# Patient Record
Sex: MALE | Race: WHITE | NOT HISPANIC OR LATINO | Employment: OTHER | URBAN - NONMETROPOLITAN AREA
[De-identification: names, ages, dates, MRNs, and addresses within clinical notes are randomized per-mention and may not be internally consistent; named-entity substitution may affect disease eponyms.]

---

## 2017-02-23 ENCOUNTER — OFFICE VISIT (OUTPATIENT)
Dept: CARDIOLOGY | Facility: CLINIC | Age: 77
End: 2017-02-23

## 2017-02-23 ENCOUNTER — TELEPHONE (OUTPATIENT)
Dept: CARDIOLOGY | Facility: CLINIC | Age: 77
End: 2017-02-23

## 2017-02-23 VITALS
WEIGHT: 167 LBS | SYSTOLIC BLOOD PRESSURE: 124 MMHG | HEART RATE: 76 BPM | BODY MASS INDEX: 22.62 KG/M2 | HEIGHT: 72 IN | DIASTOLIC BLOOD PRESSURE: 70 MMHG

## 2017-02-23 DIAGNOSIS — R53.83 OTHER FATIGUE: ICD-10-CM

## 2017-02-23 DIAGNOSIS — R06.02 SHORTNESS OF BREATH: ICD-10-CM

## 2017-02-23 DIAGNOSIS — E78.49 OTHER HYPERLIPIDEMIA: ICD-10-CM

## 2017-02-23 DIAGNOSIS — I25.10 CORONARY ARTERY DISEASE INVOLVING NATIVE CORONARY ARTERY OF NATIVE HEART WITHOUT ANGINA PECTORIS: Primary | ICD-10-CM

## 2017-02-23 DIAGNOSIS — I10 ESSENTIAL HYPERTENSION: ICD-10-CM

## 2017-02-23 PROCEDURE — 99214 OFFICE O/P EST MOD 30 MIN: CPT | Performed by: NURSE PRACTITIONER

## 2017-02-23 RX ORDER — CEPHRADINE 500 MG
1000 CAPSULE ORAL 2 TIMES DAILY
Qty: 180 EACH | Refills: 2 | Status: SHIPPED | OUTPATIENT
Start: 2017-02-23 | End: 2017-04-10 | Stop reason: SDUPTHER

## 2017-02-23 RX ORDER — RANOLAZINE 1000 MG/1
1000 TABLET, EXTENDED RELEASE ORAL 2 TIMES DAILY
Qty: 180 TABLET | Refills: 2 | Status: SHIPPED | OUTPATIENT
Start: 2017-02-23 | End: 2017-04-10 | Stop reason: SDUPTHER

## 2017-02-23 NOTE — TELEPHONE ENCOUNTER
Received call from Aretha patient's caregiver requesting an appt states patient is having increased shortness of air and anne, had appt. In January and had cancelled.  Patient is to come in this afternoon at 1:45 pm.

## 2017-02-23 NOTE — PROGRESS NOTES
Chief Complaint   Patient presents with   • Follow-up     Patient states still having alot of shortness of breath when he bends over and with any exertion. He states does not have much energy.   • Chest Pain     Has ache to left chest.   • Med Refill     Needs cardiac medication-90day.       Cardiac Complaints  Dyspnea, chest discomfort      Subjective   Zelalem Warren is a 76 y.o. male with a history of ischemic heart disease diagnosed in 2004 when he was noted to have ostial stenosis of the left circumflex. His last 2 stress test did show some changes, but he had been managed medically. At follow up in June, he had complained of more fatigue and tiredness, cardiac workup with stress and echo advised. Stress then showed small inferolateral ischemia and he continued to have problems so cath was advised. Cath then showed 65 to 70% stenosis of very small circumflex, medical management recommended.  He returns today for follow up reporting an increase in shortness of breath and fatigue since last visit here.  He states he can not quite do what he did several months ago, as the fatigue is very bothersome.  No recent labs are reported.  No refills needed.    Cardiac History  Past Surgical History   Procedure Laterality Date   • Cardiovascular stress test  11/10/2004     Stress- 2 min. 100% THR. Apical Ishchemia   • Echo - converted  11/29/2004     Echo- EF 55-60%. Apical WMA.   • Cath lab procedure  11/29/2004     Cath- 85% Ostial LCX   • Echo - converted  12/04/2009     Echo- EF 55-59%   • Cardiovascular stress test  12/04/2009     Stress- 2 min 36 sec. 91% THR. Anterior Ischemia. Norvasc 5mg 1 QD   • Cardiovascular stress test  10/10/2011     Stress- 3 min. 95% THR, BP- 178/80. AnteroSeptal Ischemia   • Echo - converted  10/10/2011     Echo-EF 65%. Septal WMA   • Echo - converted  08/02/2016     EF 65%   • Cardiovascular stress test  08/02/2016     L. Myoview- Small Anterolateral Ischemia   • Cardiac catheterization   09/22/2016     65-70% Small LCX       Current Outpatient Prescriptions   Medication Sig Dispense Refill   • aspirin 325 MG tablet Take 325 mg by mouth daily.     • Cholecalciferol (VITAMIN D3) 2000 UNITS capsule 2 capsules once daily     • esomeprazole (nexIUM) 40 MG capsule Take 40 mg by mouth Every Morning Before Breakfast.     • rosuvastatin (CRESTOR) 20 MG tablet Take 20 mg by mouth Daily.     • tamsulosin (FLOMAX) 0.4 MG capsule 24 hr capsule Take 1 capsule by mouth Every Night.     • L-Arginine 1000 MG tablet Take 1,000 mg by mouth 2 (Two) Times a Day. 180 each 2   • ranolazine (RANEXA) 1000 MG 12 hr tablet Take 1 tablet by mouth 2 (Two) Times a Day. 180 tablet 2     No current facility-administered medications for this visit.        Percocet [oxycodone-acetaminophen]    Past Medical History   Diagnosis Date   • History of kidney stones    • History of palpitations    • History of prostate disorder      laser surgery   • Hypercholesteremia    • Hypertension    • IHD (ischemic heart disease)    • PUD (peptic ulcer disease)        Social History     Social History   • Marital status:      Spouse name: N/A   • Number of children: N/A   • Years of education: N/A     Occupational History   • Not on file.     Social History Main Topics   • Smoking status: Former Smoker     Quit date: 1980   • Smokeless tobacco: Never Used   • Alcohol use No   • Drug use: No   • Sexual activity: Not on file     Other Topics Concern   • Not on file     Social History Narrative       Family History   Problem Relation Age of Onset   • Diabetes Mother    • Heart disease Mother    • Hyperlipidemia Mother    • Hypertension Mother    • Hypertension Father    • Hyperlipidemia Father    • Heart disease Father    • Diabetes Other    • Hyperlipidemia Other    • Hypertension Daughter        Review of Systems   Constitutional: Negative.    HENT: Negative.    Respiratory: Negative.    Cardiovascular: Negative.    Gastrointestinal:  "Negative.    Endocrine: Negative.    Musculoskeletal: Negative.    Skin: Negative.    Neurological: Negative.    Psychiatric/Behavioral: Negative.        DiabetesNo  Thyroidnormal    Objective     Visit Vitals   • /70 (BP Location: Right arm)   • Pulse 76   • Ht 71.5\" (181.6 cm)   • Wt 167 lb (75.8 kg)   • BMI 22.97 kg/m2       Physical Exam   Constitutional: He is oriented to person, place, and time. He appears well-developed and well-nourished.   HENT:   Head: Normocephalic and atraumatic.   Eyes: EOM are normal. Pupils are equal, round, and reactive to light.   Neck: Normal range of motion.   Cardiovascular: Normal rate and regular rhythm.    Murmur heard.  Pulmonary/Chest: Effort normal and breath sounds normal.   Abdominal: Soft.   Musculoskeletal: Normal range of motion.   Neurological: He is alert and oriented to person, place, and time.   Skin: Skin is warm and dry.   Psychiatric: He has a normal mood and affect.       Procedures    Assessment/Plan     HR and BP are stable today.  We will increase his ranexa to 1000mg BID and add l.arginine 1000mg BID to daily regimen to aid in chest discomfort.  Since patient reports more shortness of breath and fatigue with exertion, echo will be advised to look for any decline in LV function or pericardial effusion.  More recommendations to follow.  Lab order will be given with more recommendations to follow.  If echo is okay as well as labs, patient encouraged to discuss PFTs with you and further lung workup.  6 month follow up advised or sooner if needed.      Problems Addressed this Visit        Cardiovascular and Mediastinum    Hyperlipemia    Relevant Orders    CBC & Differential    Comprehensive Metabolic Panel    Lipid Panel    TSH    CAD (coronary artery disease) - Primary    Relevant Medications    ranolazine (RANEXA) 1000 MG 12 hr tablet    Other Relevant Orders    CBC & Differential    Comprehensive Metabolic Panel    Lipid Panel    TSH      Other Visit " Diagnoses     Essential hypertension        Shortness of breath        Relevant Orders    Adult Transthoracic Echo Complete    CBC & Differential    Comprehensive Metabolic Panel    Lipid Panel    D-dimer, Quantitative    Other fatigue        Relevant Orders    Adult Transthoracic Echo Complete    CBC & Differential    Comprehensive Metabolic Panel    Lipid Panel    TSH    Testosterone    Vitamin B12 & Folate    D-dimer, Quantitative                  Electronically signed by COOKIE Chapin February 24, 2017 10:14 AM

## 2017-03-01 ENCOUNTER — OUTSIDE FACILITY SERVICE (OUTPATIENT)
Dept: CARDIOLOGY | Facility: CLINIC | Age: 77
End: 2017-03-01

## 2017-03-01 ENCOUNTER — HOSPITAL ENCOUNTER (OUTPATIENT)
Dept: CARDIOLOGY | Facility: HOSPITAL | Age: 77
Discharge: HOME | End: 2017-03-01
Admitting: NURSE PRACTITIONER

## 2017-03-01 DIAGNOSIS — R53.83 OTHER FATIGUE: ICD-10-CM

## 2017-03-01 DIAGNOSIS — R06.02 SHORTNESS OF BREATH: ICD-10-CM

## 2017-03-01 PROCEDURE — 93306 TTE W/DOPPLER COMPLETE: CPT

## 2017-03-01 PROCEDURE — 93306 TTE W/DOPPLER COMPLETE: CPT | Performed by: INTERNAL MEDICINE

## 2017-04-10 ENCOUNTER — TELEPHONE (OUTPATIENT)
Dept: CARDIOLOGY | Facility: CLINIC | Age: 77
End: 2017-04-10

## 2017-04-10 RX ORDER — ROSUVASTATIN CALCIUM 20 MG/1
20 TABLET, COATED ORAL DAILY
Qty: 90 TABLET | Refills: 0 | Status: SHIPPED | OUTPATIENT
Start: 2017-04-10 | End: 2017-07-10 | Stop reason: SDUPTHER

## 2017-04-10 RX ORDER — RANOLAZINE 1000 MG/1
1000 TABLET, EXTENDED RELEASE ORAL 2 TIMES DAILY
Qty: 180 TABLET | Refills: 1 | Status: SHIPPED | OUTPATIENT
Start: 2017-04-10 | End: 2017-08-28 | Stop reason: SDUPTHER

## 2017-04-10 RX ORDER — CEPHRADINE 500 MG
1000 CAPSULE ORAL 2 TIMES DAILY
Qty: 180 EACH | Refills: 1 | Status: SHIPPED | OUTPATIENT
Start: 2017-04-10 | End: 2017-08-28 | Stop reason: SDUPTHER

## 2017-07-12 RX ORDER — ROSUVASTATIN CALCIUM 20 MG/1
TABLET, COATED ORAL
Qty: 90 TABLET | Refills: 0 | Status: SHIPPED | OUTPATIENT
Start: 2017-07-12 | End: 2017-08-28 | Stop reason: SDUPTHER

## 2017-08-28 ENCOUNTER — OFFICE VISIT (OUTPATIENT)
Dept: CARDIOLOGY | Facility: CLINIC | Age: 77
End: 2017-08-28

## 2017-08-28 VITALS
SYSTOLIC BLOOD PRESSURE: 130 MMHG | HEIGHT: 72 IN | HEART RATE: 72 BPM | WEIGHT: 168 LBS | BODY MASS INDEX: 22.75 KG/M2 | DIASTOLIC BLOOD PRESSURE: 70 MMHG

## 2017-08-28 DIAGNOSIS — I25.9 IHD (ISCHEMIC HEART DISEASE): Primary | ICD-10-CM

## 2017-08-28 DIAGNOSIS — R06.02 SHORTNESS OF BREATH: ICD-10-CM

## 2017-08-28 DIAGNOSIS — I25.10 CORONARY ARTERY DISEASE INVOLVING NATIVE CORONARY ARTERY OF NATIVE HEART WITHOUT ANGINA PECTORIS: ICD-10-CM

## 2017-08-28 DIAGNOSIS — E78.00 PURE HYPERCHOLESTEROLEMIA: ICD-10-CM

## 2017-08-28 PROCEDURE — 99213 OFFICE O/P EST LOW 20 MIN: CPT | Performed by: NURSE PRACTITIONER

## 2017-08-28 RX ORDER — CEPHRADINE 500 MG
1000 CAPSULE ORAL 2 TIMES DAILY
Qty: 180 EACH | Refills: 1 | Status: SHIPPED | OUTPATIENT
Start: 2017-08-28 | End: 2019-11-13 | Stop reason: SDUPTHER

## 2017-08-28 RX ORDER — ROSUVASTATIN CALCIUM 20 MG/1
20 TABLET, COATED ORAL NIGHTLY
Qty: 90 TABLET | Refills: 2 | Status: SHIPPED | OUTPATIENT
Start: 2017-08-28 | End: 2018-03-05 | Stop reason: SDUPTHER

## 2017-08-28 RX ORDER — RANOLAZINE 1000 MG/1
1000 TABLET, EXTENDED RELEASE ORAL 2 TIMES DAILY
Qty: 180 TABLET | Refills: 2 | Status: SHIPPED | OUTPATIENT
Start: 2017-08-28 | End: 2018-03-05 | Stop reason: SDUPTHER

## 2017-08-28 NOTE — PROGRESS NOTES
"Chief Complaint   Patient presents with   • Follow-up     For cardiac management.   • Chest Pain     He states has had some pain in left chest, he feels could be related to gas.    • Shortness of Breath     He reports follows with Dr Guzman, he is to have CT scan on lungs 9/11/17, wife states \"to check to see if 2 spots have enlarged\".    • Med Refill     Needs refills on cardiac medication-90 day.       Subjective       Zelalem Warren is a 77 y.o. male with a history of ischemic heart disease diagnosed in 2004 when he was noted to have ostial stenosis of the left circumflex. His last 2 stress test did show some changes, but he had been managed medically. At follow up in June 2016, he had complained of more fatigue and tiredness. Repeat stress showed small inferolateral ischemia and he continued to have problems. Cath was done and showed 65 to 70% stenosis of very small circumflex, medical management recommended. At his last office visit the dose of Ranexa was increased and L.arginine added. Due to shortness of breath an echocardiogram was repeated. LV function was normal, no effusion noted and RVSP unremarkable.   Today he comes to the office for a follow up appointment and denies cardiac symptoms. Sometimes he feels indigestion if \"eat wrong things\" and cola will ease the discomfort. He is maintaining his normal activities including yard work and states he has not had symptoms except shortness of breath which is not new symptom.     HPI         Cardiac History:    Past Surgical History:   Procedure Laterality Date   • CARDIAC CATHETERIZATION  09/22/2016    65-70% Small LCX   • CARDIOVASCULAR STRESS TEST  11/10/2004    Stress- 2 min. 100% THR. Apical Ishchemia   • CARDIOVASCULAR STRESS TEST  12/04/2009    Stress- 2 min 36 sec. 91% THR. Anterior Ischemia. Norvasc 5mg 1 QD   • CARDIOVASCULAR STRESS TEST  10/10/2011    Stress- 3 min. 95% THR, BP- 178/80. AnteroSeptal Ischemia   • CARDIOVASCULAR STRESS TEST  " 08/02/2016    L. Myoview- Small Anterolateral Ischemia   • CATH LAB PROCEDURE  11/29/2004    Cath- 85% Ostial LCX   • ECHO - CONVERTED  11/29/2004    Echo- EF 55-60%. Apical WMA.   • ECHO - CONVERTED  12/04/2009    Echo- EF 55-59%   • ECHO - CONVERTED  10/10/2011    Echo-EF 65%. Septal WMA   • ECHO - CONVERTED  08/02/2016    EF 65%   • ECHO - CONVERTED  03/01/2017    EF > 65%, mild MR, RVSP 27 mmHg       Current Outpatient Prescriptions   Medication Sig Dispense Refill   • aspirin 325 MG tablet Take 325 mg by mouth daily.     • Cholecalciferol (VITAMIN D3) 2000 UNITS capsule 2 capsules once daily     • esomeprazole (nexIUM) 40 MG capsule Take 40 mg by mouth Every Morning Before Breakfast.     • L-Arginine 1000 MG tablet Take 1,000 mg by mouth 2 (Two) Times a Day. 180 each 1   • ranolazine (RANEXA) 1000 MG 12 hr tablet Take 1 tablet by mouth 2 (Two) Times a Day. 180 tablet 2   • rosuvastatin (CRESTOR) 20 MG tablet Take 1 tablet by mouth Every Night. 90 tablet 2   • tamsulosin (FLOMAX) 0.4 MG capsule 24 hr capsule Take 1 capsule by mouth Every Night.     • Umeclidinium-Vilanterol (ANORO ELLIPTA) 62.5-25 MCG/INH aerosol powder  Inhale Daily.       No current facility-administered medications for this visit.        Percocet [oxycodone-acetaminophen]    Past Medical History:   Diagnosis Date   • History of kidney stones    • History of palpitations    • History of prostate disorder     laser surgery   • Hypercholesteremia    • Hypertension    • IHD (ischemic heart disease)    • PUD (peptic ulcer disease)        Social History     Social History   • Marital status:      Spouse name: N/A   • Number of children: N/A   • Years of education: N/A     Occupational History   • Not on file.     Social History Main Topics   • Smoking status: Former Smoker     Quit date: 1980   • Smokeless tobacco: Never Used   • Alcohol use No   • Drug use: No   • Sexual activity: Not on file     Other Topics Concern   • Not on file  "    Social History Narrative       Family History   Problem Relation Age of Onset   • Diabetes Mother    • Heart disease Mother    • Hyperlipidemia Mother    • Hypertension Mother    • Hypertension Father    • Hyperlipidemia Father    • Heart disease Father    • Diabetes Other    • Hyperlipidemia Other    • Hypertension Daughter        Review of Systems   Constitution: Negative for decreased appetite and malaise/fatigue.   HENT: Negative for congestion and sore throat.    Eyes: Negative for blurred vision.   Cardiovascular: Negative for chest pain, dyspnea on exertion, leg swelling, near-syncope and palpitations.   Respiratory: Positive for shortness of breath. Negative for sleep disturbances due to breathing.    Endocrine: Negative for cold intolerance and heat intolerance.   Hematologic/Lymphatic: Negative for adenopathy. Does not bruise/bleed easily.   Skin: Positive for skin cancer (follows [facility] dermatologist). Negative for itching and nail changes.   Musculoskeletal: Positive for arthritis. Negative for myalgias.   Gastrointestinal: Positive for heartburn. Negative for abdominal pain, dysphagia, melena and nausea.   Genitourinary: Negative for dysuria and frequency.   Neurological: Negative for dizziness, light-headedness, seizures and vertigo.   Psychiatric/Behavioral: Negative for altered mental status. The patient does not have insomnia.    Allergic/Immunologic: Negative for hives.        Diabetes- No  Thyroid-normal    Objective     /70 (BP Location: Left arm)  Pulse 72  Ht 71.5\" (181.6 cm)  Wt 168 lb (76.2 kg)  BMI 23.1 kg/m2    Physical Exam   Constitutional: He is oriented to person, place, and time. He appears well-nourished.   HENT:   Head: Normocephalic.   Eyes: Conjunctivae are normal. Pupils are equal, round, and reactive to light.   Neck: Normal range of motion. No JVD present. Carotid bruit is not present. No thyromegaly present.   Cardiovascular: Normal rate, regular rhythm, S1 normal " "and normal pulses.    No murmur heard.  Slightly loud S2   Pulmonary/Chest: Effort normal and breath sounds normal. He has no wheezes. He has no rales.   Abdominal: Soft. Bowel sounds are normal. He exhibits no distension. There is no tenderness.   Musculoskeletal: Normal range of motion. He exhibits no edema.   Neurological: He is alert and oriented to person, place, and time.   Skin: Skin is warm and dry. No rash noted. No pallor.   Psychiatric: He has a normal mood and affect. His behavior is normal.    Procedures        Assessment/Plan      Zelalem was seen today for follow-up, chest pain, shortness of breath and med refill.    Diagnoses and all orders for this visit:    IHD (ischemic heart disease)    Coronary artery disease involving native coronary artery of native heart without angina pectoris    Pure hypercholesterolemia    Shortness of breath    Other orders  -     L-Arginine 1000 MG tablet; Take 1,000 mg by mouth 2 (Two) Times a Day.  -     ranolazine (RANEXA) 1000 MG 12 hr tablet; Take 1 tablet by mouth 2 (Two) Times a Day.  -     rosuvastatin (CRESTOR) 20 MG tablet; Take 1 tablet by mouth Every Night.        The report of his most recent echocardiogram was reviewed. He is following with Dr. Kilpatrick for management of pulmonary issues. Shortnes of breath has improved with current inhaler. Next month he will have CT scan of lung for evaluation of \"spots on lung\". He reports improvement of chest discomfort since increasing Ranexa and adding L-arginine. His vital signs and BMI are normal. We will continue same cardiac medication at this time. I encouraged him on maintaining activity, avoid extreme heat/cold. He will follow with you for management of labs.            Electronically signed by COOKIE Martinez,  August 30, 2017 11:11 AM  "

## 2018-03-05 ENCOUNTER — OFFICE VISIT (OUTPATIENT)
Dept: CARDIOLOGY | Facility: CLINIC | Age: 78
End: 2018-03-05

## 2018-03-05 VITALS
WEIGHT: 169 LBS | HEIGHT: 71 IN | HEART RATE: 80 BPM | DIASTOLIC BLOOD PRESSURE: 68 MMHG | SYSTOLIC BLOOD PRESSURE: 110 MMHG | BODY MASS INDEX: 23.66 KG/M2

## 2018-03-05 DIAGNOSIS — I25.9 IHD (ISCHEMIC HEART DISEASE): Primary | ICD-10-CM

## 2018-03-05 DIAGNOSIS — E78.2 MIXED HYPERLIPIDEMIA: ICD-10-CM

## 2018-03-05 DIAGNOSIS — I25.10 CORONARY ARTERY DISEASE INVOLVING NATIVE CORONARY ARTERY OF NATIVE HEART WITHOUT ANGINA PECTORIS: ICD-10-CM

## 2018-03-05 PROCEDURE — 99213 OFFICE O/P EST LOW 20 MIN: CPT | Performed by: NURSE PRACTITIONER

## 2018-03-05 RX ORDER — RANOLAZINE 1000 MG/1
1000 TABLET, EXTENDED RELEASE ORAL EVERY 12 HOURS SCHEDULED
Qty: 180 TABLET | Refills: 3 | Status: SHIPPED | OUTPATIENT
Start: 2018-03-05 | End: 2018-09-10 | Stop reason: SDUPTHER

## 2018-03-05 RX ORDER — ROSUVASTATIN CALCIUM 20 MG/1
20 TABLET, COATED ORAL NIGHTLY
Qty: 90 TABLET | Refills: 3 | Status: SHIPPED | OUTPATIENT
Start: 2018-03-05 | End: 2018-09-10 | Stop reason: SDUPTHER

## 2018-03-05 NOTE — PROGRESS NOTES
Chief Complaint   Patient presents with   • Follow-up     For cardiac management. Last labs in chart.   • Dizziness     He states when he bends over and stands up, he feels related to lungs.   • Shortness of Breath     He states about the same, follows with Dr Guzman.   • Med Refill     Needs refills on cardiac medication-90 day.       Subjective       Zelalem Warren is a 77 y.o. male with a history of ischemic heart disease diagnosed in 2004 when he was noted to have ostial stenosis of the left circumflex. His last 2 stress test did show some changes, but he had been managed medically. At follow up in June 2016, he had complained of more fatigue and tiredness. Repeat stress showed small inferolateral ischemia and he continued to have problems. Cath was done and showed 65 to 70% stenosis of very small circumflex, medical management recommended.  The dose of Ranexa was increased and L.arginine added.  Due to shortness of breath an echocardiogram was repeated.  LV function was normal, no effusion noted and RVSP unremarkable.   He came today for his regular follow up visit.  He is feeling well other than shortness of breath which is mild and unchanged.  He does follow with Dr. Guzman.  He denies chest pain, weakness, or fatigue.  He remains active with his activities.  Labs have been managed with primary care.  Most recent lipids in April 2017 showed cholesterol 173, tri 98, HDL 49, and .  Refills requested on cardiac meds.      HPI         Cardiac History:    Past Surgical History:   Procedure Laterality Date   • CARDIAC CATHETERIZATION  09/22/2016    65-70% Small LCX   • CARDIOVASCULAR STRESS TEST  11/10/2004    Stress- 2 min. 100% THR. Apical Ishchemia   • CARDIOVASCULAR STRESS TEST  12/04/2009    Stress- 2 min 36 sec. 91% THR. Anterior Ischemia. Norvasc 5mg 1 QD   • CARDIOVASCULAR STRESS TEST  10/10/2011    Stress- 3 min. 95% THR, BP- 178/80. AnteroSeptal Ischemia   • CARDIOVASCULAR STRESS TEST  08/02/2016     L. Myoview- Small Anterolateral Ischemia   • CATH LAB PROCEDURE  11/29/2004    Cath- 85% Ostial LCX   • ECHO - CONVERTED  11/29/2004    Echo- EF 55-60%. Apical WMA.   • ECHO - CONVERTED  12/04/2009    Echo- EF 55-59%   • ECHO - CONVERTED  10/10/2011    Echo-EF 65%. Septal WMA   • ECHO - CONVERTED  08/02/2016    EF 65%   • ECHO - CONVERTED  03/01/2017    EF > 65%, mild MR, RVSP 27 mmHg       Current Outpatient Prescriptions   Medication Sig Dispense Refill   • aspirin 325 MG tablet Take 325 mg by mouth daily.     • Cholecalciferol (VITAMIN D3) 2000 UNITS capsule 2 capsules once daily     • esomeprazole (nexIUM) 40 MG capsule Take 40 mg by mouth Every Morning Before Breakfast.     • L-Arginine 1000 MG tablet Take 1,000 mg by mouth 2 (Two) Times a Day. 180 each 1   • ranolazine (RANEXA) 1000 MG 12 hr tablet Take 1 tablet by mouth Every 12 (Twelve) Hours. 180 tablet 3   • rosuvastatin (CRESTOR) 20 MG tablet Take 1 tablet by mouth Every Night. 90 tablet 3   • tamsulosin (FLOMAX) 0.4 MG capsule 24 hr capsule Take 1 capsule by mouth Every Night.     • Umeclidinium-Vilanterol (ANORO ELLIPTA) 62.5-25 MCG/INH aerosol powder  Inhale Daily.       No current facility-administered medications for this visit.        Percocet [oxycodone-acetaminophen]    Past Medical History:   Diagnosis Date   • History of kidney stones    • History of palpitations    • History of prostate disorder     laser surgery   • Hypercholesteremia    • Hypertension    • IHD (ischemic heart disease)    • PUD (peptic ulcer disease)        Social History     Social History   • Marital status:      Spouse name: N/A   • Number of children: N/A   • Years of education: N/A     Occupational History   • Not on file.     Social History Main Topics   • Smoking status: Former Smoker     Quit date: 1980   • Smokeless tobacco: Never Used   • Alcohol use No   • Drug use: No   • Sexual activity: Not on file     Other Topics Concern   • Not on file     Social  "History Narrative       Family History   Problem Relation Age of Onset   • Diabetes Mother    • Heart disease Mother    • Hyperlipidemia Mother    • Hypertension Mother    • Hypertension Father    • Hyperlipidemia Father    • Heart disease Father    • Diabetes Other    • Hyperlipidemia Other    • Hypertension Daughter        Review of Systems   Constitution: Negative for decreased appetite, weakness, malaise/fatigue, weight gain and weight loss.   HENT: Negative.    Eyes: Negative.    Cardiovascular: Positive for dyspnea on exertion. Negative for chest pain, leg swelling, orthopnea, palpitations and syncope.   Respiratory: Positive for shortness of breath. Negative for cough and wheezing.    Endocrine: Negative.    Hematologic/Lymphatic: Negative.    Skin: Negative.    Musculoskeletal: Negative for joint pain and myalgias.   Gastrointestinal: Negative for abdominal pain, heartburn and melena.   Genitourinary: Negative for dysuria and hematuria.   Neurological: Negative for dizziness.   Psychiatric/Behavioral: Negative for altered mental status and depression.   Allergic/Immunologic: Negative.         Diabetes- No  Thyroid-normal    Objective     /68 (BP Location: Right arm)  Pulse 80  Ht 181.6 cm (71.5\")  Wt 76.7 kg (169 lb)  BMI 23.24 kg/m2    Physical Exam   Constitutional: He is oriented to person, place, and time. He appears well-developed and well-nourished.   HENT:   Head: Normocephalic.   Eyes: Pupils are equal, round, and reactive to light.   Neck: Normal range of motion.   Cardiovascular: Normal rate, regular rhythm and intact distal pulses.    Murmur heard.  Pulmonary/Chest: Effort normal and breath sounds normal.   Abdominal: Soft. Bowel sounds are normal. He exhibits no distension.   Musculoskeletal: Normal range of motion. He exhibits no edema.   Neurological: He is alert and oriented to person, place, and time.   Skin: Skin is warm and dry.   Psychiatric: He has a normal mood and affect. "   Nursing note and vitals reviewed.     Procedures          Assessment/Plan    Heart rate and blood pressure stable.  Most recent cardiac work up reviewed with him which showed normal LV function and mild MR.  We will continue medical management.  Refills sent for Crestor and Ranexa.  Continue L-arginine.  He remains on 325 mg aspirin without excessive bruising or bleeding.  Will continue the same for now.  He was encouraged to remain active.  Maintain adequate hydration.  Follow heart healthy diet low in saturated fat and cholesterol.  Body mass index is 23.24 kg/(m^2).  This is normal.  He appears to be stable.  He is asymptomatic, so no further work up ordered today.  We will see him back in six months or sooner if needed.          Zelalem was seen today for follow-up, dizziness, shortness of breath and med refill.    Diagnoses and all orders for this visit:    IHD (ischemic heart disease)    Coronary artery disease involving native coronary artery of native heart without angina pectoris    Mixed hyperlipidemia    Other orders  -     rosuvastatin (CRESTOR) 20 MG tablet; Take 1 tablet by mouth Every Night.  -     ranolazine (RANEXA) 1000 MG 12 hr tablet; Take 1 tablet by mouth Every 12 (Twelve) Hours.                  Electronically signed by COOKIE Wooten,  March 5, 2018 5:28 PM

## 2018-09-10 ENCOUNTER — OFFICE VISIT (OUTPATIENT)
Dept: CARDIOLOGY | Facility: CLINIC | Age: 78
End: 2018-09-10

## 2018-09-10 VITALS
HEART RATE: 76 BPM | WEIGHT: 169 LBS | HEIGHT: 71 IN | DIASTOLIC BLOOD PRESSURE: 80 MMHG | BODY MASS INDEX: 23.66 KG/M2 | SYSTOLIC BLOOD PRESSURE: 130 MMHG

## 2018-09-10 DIAGNOSIS — E78.2 MIXED HYPERLIPIDEMIA: ICD-10-CM

## 2018-09-10 DIAGNOSIS — R06.02 SHORTNESS OF BREATH: ICD-10-CM

## 2018-09-10 DIAGNOSIS — I20.9 ANGINAL SYNDROME (HCC): ICD-10-CM

## 2018-09-10 DIAGNOSIS — I25.10 CORONARY ARTERY DISEASE INVOLVING NATIVE CORONARY ARTERY OF NATIVE HEART WITHOUT ANGINA PECTORIS: Primary | ICD-10-CM

## 2018-09-10 PROCEDURE — 99213 OFFICE O/P EST LOW 20 MIN: CPT | Performed by: NURSE PRACTITIONER

## 2018-09-10 RX ORDER — ROSUVASTATIN CALCIUM 20 MG/1
20 TABLET, COATED ORAL NIGHTLY
Qty: 90 TABLET | Refills: 3 | Status: SHIPPED | OUTPATIENT
Start: 2018-09-10 | End: 2019-03-12 | Stop reason: SDUPTHER

## 2018-09-10 RX ORDER — RANOLAZINE 1000 MG/1
1000 TABLET, EXTENDED RELEASE ORAL EVERY 12 HOURS SCHEDULED
Qty: 180 TABLET | Refills: 3 | Status: SHIPPED | OUTPATIENT
Start: 2018-09-10 | End: 2019-03-12 | Stop reason: SDUPTHER

## 2018-09-10 NOTE — PROGRESS NOTES
Chief Complaint   Patient presents with   • Follow-up     For cardiac management. Patient is on aspirin. Reports that he has had some shortness of breath. Last lab work was done a couple of months ago per PCP, not in chart.    • Med Refill     Needs refills on cardiac medications. 90 day supplys to [facility] in [place].        Cardiac Complaints  dyspnea      Subjective   Zelalem KATHERINE Warren is a 78 y.o. male with ischemic heart disease and hyperlipidemia diagnosed in 2004 when he was noted to have ostial stenosis of the left circumflex. His last 2 stress test did show some changes, but he had been managed medically. At follow up in June 2016, he had complained of more fatigue and tiredness. Repeat stress showed small inferolateral ischemia and he continued to have problems. Cath was done and showed 65 to 70% stenosis of very small circumflex, medical management recommended.  The dose of Ranexa was increased and L.arginine added.  Due to shortness of breath an echocardiogram was repeated.  LV function was normal, no effusion noted,and RVSP unremarkable. He does continue to have shortness of breath but attributes to his underlying lung issues for which he follows with Dr. Guzman and uses anoro inhaler.Labs are done with PCP most recent done with PCP a few months ago, no recent copy is available for review.  Refills of crestor and ranexa requested for 90 day supply.        Cardiac History  Past Surgical History:   Procedure Laterality Date   • CARDIAC CATHETERIZATION  09/22/2016    65-70% Small LCX   • CARDIOVASCULAR STRESS TEST  11/10/2004    Stress- 2 min. 100% THR. Apical Ishchemia   • CARDIOVASCULAR STRESS TEST  12/04/2009    Stress- 2 min 36 sec. 91% THR. Anterior Ischemia. Norvasc 5mg 1 QD   • CARDIOVASCULAR STRESS TEST  10/10/2011    Stress- 3 min. 95% THR, BP- 178/80. AnteroSeptal Ischemia   • CARDIOVASCULAR STRESS TEST  08/02/2016    L. Myoview- Small Anterolateral Ischemia   • CATH LAB PROCEDURE   11/29/2004    Cath- 85% Ostial LCX   • ECHO - CONVERTED  11/29/2004    Echo- EF 55-60%. Apical WMA.   • ECHO - CONVERTED  12/04/2009    Echo- EF 55-59%   • ECHO - CONVERTED  10/10/2011    Echo-EF 65%. Septal WMA   • ECHO - CONVERTED  08/02/2016    EF 65%   • ECHO - CONVERTED  03/01/2017    EF > 65%, mild MR, RVSP 27 mmHg       Current Outpatient Prescriptions   Medication Sig Dispense Refill   • aspirin 325 MG tablet Take 325 mg by mouth daily.     • Cholecalciferol (VITAMIN D3) 2000 UNITS capsule 2 capsules once daily     • esomeprazole (nexIUM) 40 MG capsule Take 40 mg by mouth Every Morning Before Breakfast.     • L-Arginine 1000 MG tablet Take 1,000 mg by mouth 2 (Two) Times a Day. 180 each 1   • ranolazine (RANEXA) 1000 MG 12 hr tablet Take 1 tablet by mouth Every 12 (Twelve) Hours. 180 tablet 3   • rosuvastatin (CRESTOR) 20 MG tablet Take 1 tablet by mouth Every Night. 90 tablet 3   • tamsulosin (FLOMAX) 0.4 MG capsule 24 hr capsule Take 1 capsule by mouth Every Night.     • Umeclidinium-Vilanterol (ANORO ELLIPTA) 62.5-25 MCG/INH aerosol powder  Inhale Daily.       No current facility-administered medications for this visit.        Percocet [oxycodone-acetaminophen]    Past Medical History:   Diagnosis Date   • History of kidney stones    • History of palpitations    • History of prostate disorder     laser surgery   • Hypercholesteremia    • Hypertension    • IHD (ischemic heart disease)    • PUD (peptic ulcer disease)        Social History     Social History   • Marital status:      Spouse name: N/A   • Number of children: N/A   • Years of education: N/A     Occupational History   • Not on file.     Social History Main Topics   • Smoking status: Former Smoker     Quit date: 1980   • Smokeless tobacco: Never Used   • Alcohol use No   • Drug use: No   • Sexual activity: Not on file     Other Topics Concern   • Not on file     Social History Narrative   • No narrative on file       Family History  "  Problem Relation Age of Onset   • Diabetes Mother    • Heart disease Mother    • Hyperlipidemia Mother    • Hypertension Mother    • Hypertension Father    • Hyperlipidemia Father    • Heart disease Father    • Diabetes Other    • Hyperlipidemia Other    • Hypertension Daughter        Review of Systems   Constitution: Negative for weakness and malaise/fatigue.   Cardiovascular: Positive for dyspnea on exertion. Negative for chest pain, claudication, irregular heartbeat, leg swelling, near-syncope, palpitations and syncope.   Respiratory: Positive for shortness of breath. Negative for cough and wheezing.    Musculoskeletal: Negative for back pain, joint pain and joint swelling.   Gastrointestinal: Negative for anorexia, heartburn, nausea and vomiting.   Genitourinary: Negative for dysuria, hematuria, hesitancy and nocturia.   Neurological: Negative for dizziness, headaches and light-headedness.   Psychiatric/Behavioral: Negative for depression and memory loss. The patient is not nervous/anxious.            Objective     /80 (BP Location: Left arm)   Pulse 76   Ht 181.6 cm (71.5\")   Wt 76.7 kg (169 lb)   BMI 23.24 kg/m²     Physical Exam   Constitutional: He is oriented to person, place, and time. He appears well-developed and well-nourished.   HENT:   Head: Normocephalic and atraumatic.   Eyes: Pupils are equal, round, and reactive to light. EOM are normal.   Neck: Normal range of motion. Neck supple.   Cardiovascular: Normal rate and regular rhythm.    Murmur heard.  Pulmonary/Chest: Effort normal and breath sounds normal.   Abdominal: Soft.   Musculoskeletal: Normal range of motion.   Neurological: He is alert and oriented to person, place, and time.   Skin: Skin is warm and dry.   Psychiatric: He has a normal mood and affect. His behavior is normal.       Procedures    Assessment/Plan     HR and BP are stable today.  Chest pain remains well controlled with current ranexa and L.arginine therapy, no " adjustment advised.  For shortness of breath, he continues to follow with Dr. Guzman and states problem still present but no worse than prior and may be slightly better.  He continues to use anoro in regards.  Lipids remain managed with crestor therapy for which patient reports tolerating well.  Labs including FLP he reports with your office.  Could we have next for our review?  Refills of ranexa and crestor sent per request.  BMI stable at 23.24, good cardiac diet with activity a tolerated advised.  6 month follow up scheduled or sooner if needed.      Problems Addressed this Visit        Cardiovascular and Mediastinum    Hyperlipemia    Relevant Medications    rosuvastatin (CRESTOR) 20 MG tablet    CAD (coronary artery disease) - Primary    Relevant Medications    ranolazine (RANEXA) 1000 MG 12 hr tablet      Other Visit Diagnoses     Shortness of breath        Anginal syndrome (CMS/HCC)        Relevant Medications    ranolazine (RANEXA) 1000 MG 12 hr tablet          Patient's Body mass index is 23.24 kg/m². BMI is within normal parameters. No follow-up required.          Electronically signed by COOKIE Chapin September 10, 2018 3:57 PM

## 2019-03-12 ENCOUNTER — OFFICE VISIT (OUTPATIENT)
Dept: CARDIOLOGY | Facility: CLINIC | Age: 79
End: 2019-03-12

## 2019-03-12 VITALS
HEIGHT: 72 IN | BODY MASS INDEX: 21.4 KG/M2 | WEIGHT: 158 LBS | SYSTOLIC BLOOD PRESSURE: 104 MMHG | DIASTOLIC BLOOD PRESSURE: 62 MMHG | HEART RATE: 88 BPM

## 2019-03-12 DIAGNOSIS — R05.9 COUGH: ICD-10-CM

## 2019-03-12 DIAGNOSIS — R63.4 WEIGHT LOSS: ICD-10-CM

## 2019-03-12 DIAGNOSIS — Z79.899 MEDICATION MANAGEMENT: ICD-10-CM

## 2019-03-12 DIAGNOSIS — E78.2 MIXED HYPERLIPIDEMIA: ICD-10-CM

## 2019-03-12 DIAGNOSIS — I25.9 IHD (ISCHEMIC HEART DISEASE): Primary | ICD-10-CM

## 2019-03-12 PROCEDURE — 99213 OFFICE O/P EST LOW 20 MIN: CPT | Performed by: NURSE PRACTITIONER

## 2019-03-12 RX ORDER — ROSUVASTATIN CALCIUM 20 MG/1
20 TABLET, COATED ORAL NIGHTLY
Qty: 90 TABLET | Refills: 3 | Status: SHIPPED | OUTPATIENT
Start: 2019-03-12 | End: 2019-11-13 | Stop reason: SDUPTHER

## 2019-03-12 RX ORDER — CELECOXIB 200 MG/1
200 CAPSULE ORAL DAILY
COMMUNITY
End: 2021-06-10 | Stop reason: ALTCHOICE

## 2019-03-12 RX ORDER — LORATADINE 10 MG/1
CAPSULE, LIQUID FILLED ORAL DAILY
COMMUNITY
End: 2020-05-19 | Stop reason: ALTCHOICE

## 2019-03-12 RX ORDER — RANOLAZINE 1000 MG/1
1000 TABLET, EXTENDED RELEASE ORAL EVERY 12 HOURS SCHEDULED
Qty: 180 TABLET | Refills: 3 | Status: SHIPPED | OUTPATIENT
Start: 2019-03-12 | End: 2019-11-13 | Stop reason: SDUPTHER

## 2019-03-12 NOTE — PROGRESS NOTES
Chief Complaint   Patient presents with   • Follow-up     for cardiac management   • Shortness of Breath     has had pneumonia/ bronchitis for the past 4 weeks. Having a lot of SOB, recently went to ER, no pneumonia, just bronchitis.    • Chest Pain     random, right chest, contributes to coughing so much   • Labs     labs at ER Saturday   • Med Refill     refills needed on Ranexa and Crestor, 90 days to [facility]       [place]       Zelalem Warren is a 78 y.o. male with ischemic heart disease and hyperlipidemia diagnosed in 2004 when he was noted to have ostial stenosis of the left circumflex. Repeat stress test did show some changes, but managed medically. At follow up in June 2016, he had complained of more fatigue and tiredness. Repeat stress showed small inferolateral ischemia followed by cath that showed 65 to 70% stenosis of very small circumflex. Medical management was recommended.  The dose of Ranexa was increased and L.arginine added.  Due to shortness of breath an echocardiogram was repeated.  LV function was normal, no effusion noted, and RVSP unremarkable. He follows with Dr. Guzman for management of pulmonary issues.    Today he comes to the office for a follow up visit.  He is currently being treated for pneumonia and bronchitis.  He admits to some chest discomfort related to cough and shortness of breath related to current respiratory illness.  No palpitations are noted.    HPI     Cardiac History:    Past Surgical History:   Procedure Laterality Date   • CARDIAC CATHETERIZATION  09/22/2016    65-70% Small LCX   • CARDIOVASCULAR STRESS TEST  11/10/2004    Stress- 2 min. 100% THR. Apical Ishchemia   • CARDIOVASCULAR STRESS TEST  12/04/2009    Stress- 2 min 36 sec. 91% THR. Anterior Ischemia. Norvasc 5mg 1 QD   • CARDIOVASCULAR STRESS TEST  10/10/2011    Stress- 3 min. 95% THR, BP- 178/80. AnteroSeptal Ischemia   • CARDIOVASCULAR STRESS TEST  08/02/2016    L. Myoview- Small Anterolateral Ischemia    • CATH LAB PROCEDURE  11/29/2004    Cath- 85% Ostial LCX   • ECHO - CONVERTED  11/29/2004    Echo- EF 55-60%. Apical WMA.   • ECHO - CONVERTED  12/04/2009    Echo- EF 55-59%   • ECHO - CONVERTED  10/10/2011    Echo-EF 65%. Septal WMA   • ECHO - CONVERTED  08/02/2016    EF 65%   • ECHO - CONVERTED  03/01/2017    EF > 65%, mild MR, RVSP 27 mmHg       Current Outpatient Medications   Medication Sig Dispense Refill   • aspirin 325 MG tablet Take 325 mg by mouth daily.     • carbidopa-levodopa (SINEMET)  MG per tablet Take 1 tablet by mouth Daily.     • celecoxib (CeleBREX) 200 MG capsule Take 200 mg by mouth Daily.     • Cholecalciferol (VITAMIN D3) 2000 UNITS capsule 2 capsules once daily     • esomeprazole (nexIUM) 40 MG capsule Take 40 mg by mouth Every Morning Before Breakfast.     • L-Arginine 1000 MG tablet Take 1,000 mg by mouth 2 (Two) Times a Day. 180 each 1   • Loratadine 10 MG capsule Take  by mouth Daily.     • ranolazine (RANEXA) 1000 MG 12 hr tablet Take 1 tablet by mouth Every 12 (Twelve) Hours. 180 tablet 3   • rosuvastatin (CRESTOR) 20 MG tablet Take 1 tablet by mouth Every Night. 90 tablet 3   • tamsulosin (FLOMAX) 0.4 MG capsule 24 hr capsule Take 1 capsule by mouth Every Night.       No current facility-administered medications for this visit.        Percocet [oxycodone-acetaminophen]    Past Medical History:   Diagnosis Date   • History of kidney stones    • History of palpitations    • History of prostate disorder     laser surgery   • Hypercholesteremia    • Hypertension    • IHD (ischemic heart disease)    • PUD (peptic ulcer disease)        Social History     Socioeconomic History   • Marital status:      Spouse name: Not on file   • Number of children: Not on file   • Years of education: Not on file   • Highest education level: Not on file   Social Needs   • Financial resource strain: Not on file   • Food insecurity - worry: Not on file   • Food insecurity - inability: Not on  "file   • Transportation needs - medical: Not on file   • Transportation needs - non-medical: Not on file   Occupational History   • Not on file   Tobacco Use   • Smoking status: Former Smoker     Last attempt to quit: 1980     Years since quittin.2   • Smokeless tobacco: Never Used   Substance and Sexual Activity   • Alcohol use: No   • Drug use: No   • Sexual activity: Not on file   Other Topics Concern   • Not on file   Social History Narrative   • Not on file       Family History   Problem Relation Age of Onset   • Diabetes Mother    • Heart disease Mother    • Hyperlipidemia Mother    • Hypertension Mother    • Hypertension Father    • Hyperlipidemia Father    • Heart disease Father    • Diabetes Other    • Hyperlipidemia Other    • Hypertension Daughter        Review of Systems   Constitution: Positive for decreased appetite and weight loss.   HENT: Positive for congestion and sore throat. Negative for ear pain and hoarse voice.    Eyes: Negative for redness and visual disturbance.   Cardiovascular: Negative for chest pain, leg swelling, near-syncope and palpitations.   Respiratory: Positive for cough, shortness of breath and sputum production.    Endocrine: Negative for polydipsia, polyphagia and polyuria.   Hematologic/Lymphatic: Negative for bleeding problem. Does not bruise/bleed easily.   Skin: Negative for dry skin and itching.   Musculoskeletal: Negative for muscle cramps and muscle weakness.   Gastrointestinal: Negative for abdominal pain, dysphagia, melena and nausea.   Genitourinary: Negative for dysuria and hematuria.   Neurological: Positive for headaches. Negative for dizziness and light-headedness.   Psychiatric/Behavioral: Negative for altered mental status. The patient is not nervous/anxious.    Allergic/Immunologic: Positive for environmental allergies. Negative for hives.        Objective     /62   Pulse 88   Ht 181.6 cm (71.5\")   Wt 71.7 kg (158 lb)   BMI 21.73 kg/m² "     Physical Exam   Constitutional: He is oriented to person, place, and time. He appears well-nourished.   HENT:   Head: Normocephalic.   Eyes: Pupils are equal, round, and reactive to light.   Neck: Normal range of motion. Carotid bruit is not present.   Cardiovascular: Normal rate, regular rhythm, S1 normal, S2 normal and normal pulses.   No murmur heard.  Pulmonary/Chest: No respiratory distress. He has rhonchi.   Abdominal: Soft. Bowel sounds are normal. There is no tenderness.   Musculoskeletal: Normal range of motion. He exhibits no edema.   Neurological: He is alert and oriented to person, place, and time.   Skin: Skin is warm.   Psychiatric: He has a normal mood and affect. His behavior is normal.        Procedures: none today        Assessment/Plan      Zelalem was seen today for follow-up, shortness of breath, chest pain, labs and med refill.    Diagnoses and all orders for this visit:    IHD (ischemic heart disease)    Mixed hyperlipidemia    Medication management    Cough    Weight loss    Other orders  -     ranolazine (RANEXA) 1000 MG 12 hr tablet; Take 1 tablet by mouth Every 12 (Twelve) Hours.  -     rosuvastatin (CRESTOR) 20 MG tablet; Take 1 tablet by mouth Every Night.        Patient's Body mass index is 21.73 kg/m². BMI is within normal parameters. No follow-up required..  His weight is down about 10 pounds in recent labs shows BUN of 27 and albumin 3.  I discussed with him the importance of maintaining good hydration and adequate nutritional intake to promote immune system and healing.    For hypercholesterolemia he takes Crestor.  Shows normal liver function test.  Refills given to continue same dose of Crestor.    His blood pressure is low normal today.  No medication changes advised.  Again hydration encouraged.    He denies anginal type chest pain or palpitations.  We will continue Ranexa and refills given.  No cardiac workup advised at this time.    Is currently being treated for  respiratory illness.  He has appointment to see pulmonologist at end of week.  Advised him to keep the appointment or call sooner should he develop more issues.    We will plan to see him back in 6 months or sooner for cardiac concerns.           Electronically signed by COOKIE Martinez,  March 12, 2019 12:42 PM

## 2019-09-10 ENCOUNTER — OUTSIDE FACILITY SERVICE (OUTPATIENT)
Dept: CARDIOLOGY | Facility: CLINIC | Age: 79
End: 2019-09-10

## 2019-09-10 PROCEDURE — 99214 OFFICE O/P EST MOD 30 MIN: CPT | Performed by: INTERNAL MEDICINE

## 2019-09-11 ENCOUNTER — OUTSIDE FACILITY SERVICE (OUTPATIENT)
Dept: CARDIOLOGY | Facility: CLINIC | Age: 79
End: 2019-09-11

## 2019-09-11 PROCEDURE — 78452 HT MUSCLE IMAGE SPECT MULT: CPT | Performed by: INTERNAL MEDICINE

## 2019-09-11 PROCEDURE — 99214 OFFICE O/P EST MOD 30 MIN: CPT | Performed by: INTERNAL MEDICINE

## 2019-09-11 PROCEDURE — 93018 CV STRESS TEST I&R ONLY: CPT | Performed by: INTERNAL MEDICINE

## 2019-09-11 PROCEDURE — 93306 TTE W/DOPPLER COMPLETE: CPT | Performed by: INTERNAL MEDICINE

## 2019-11-13 ENCOUNTER — OFFICE VISIT (OUTPATIENT)
Dept: CARDIOLOGY | Facility: CLINIC | Age: 79
End: 2019-11-13

## 2019-11-13 VITALS
HEIGHT: 72 IN | SYSTOLIC BLOOD PRESSURE: 114 MMHG | DIASTOLIC BLOOD PRESSURE: 70 MMHG | BODY MASS INDEX: 22.08 KG/M2 | HEART RATE: 92 BPM | WEIGHT: 163 LBS

## 2019-11-13 DIAGNOSIS — R01.1 MURMUR, CARDIAC: ICD-10-CM

## 2019-11-13 DIAGNOSIS — I25.9 IHD (ISCHEMIC HEART DISEASE): Primary | ICD-10-CM

## 2019-11-13 DIAGNOSIS — E78.00 HYPERCHOLESTEREMIA: ICD-10-CM

## 2019-11-13 DIAGNOSIS — R06.02 SHORTNESS OF BREATH: ICD-10-CM

## 2019-11-13 DIAGNOSIS — G25.2 COARSE TREMORS: ICD-10-CM

## 2019-11-13 PROCEDURE — 99213 OFFICE O/P EST LOW 20 MIN: CPT | Performed by: INTERNAL MEDICINE

## 2019-11-13 RX ORDER — RANOLAZINE 1000 MG/1
1000 TABLET, EXTENDED RELEASE ORAL EVERY 12 HOURS SCHEDULED
Qty: 180 TABLET | Refills: 3 | Status: SHIPPED | OUTPATIENT
Start: 2019-11-13 | End: 2020-05-19 | Stop reason: SDUPTHER

## 2019-11-13 RX ORDER — ROSUVASTATIN CALCIUM 20 MG/1
20 TABLET, COATED ORAL NIGHTLY
Qty: 90 TABLET | Refills: 3 | Status: SHIPPED | OUTPATIENT
Start: 2019-11-13 | End: 2020-05-19 | Stop reason: SDUPTHER

## 2019-11-13 RX ORDER — ASPIRIN 325 MG
325 TABLET ORAL DAILY
Qty: 90 TABLET | Refills: 3 | Status: SHIPPED | OUTPATIENT
Start: 2019-11-13 | End: 2020-11-23 | Stop reason: SDUPTHER

## 2019-11-13 RX ORDER — CEPHRADINE 500 MG
1000 CAPSULE ORAL 2 TIMES DAILY
Qty: 180 EACH | Refills: 3 | Status: SHIPPED | OUTPATIENT
Start: 2019-11-13 | End: 2020-05-19 | Stop reason: SDUPTHER

## 2019-11-13 NOTE — PROGRESS NOTES
Chief Complaint   Patient presents with   • Hospital follow up      for cardiac management, doing well   • Med Refill     refills needed on cardiac meds.  90 days to [facility]   • Labs     none since discharge   • Procedure     bronchoscopy with washing 2 weeks ago, Dr Guzman       CARDIAC COMPLAINTS  dyspnea        Subjective   Phoenix L Briana is a 79 y.o. male came in today for his hospital follow-up visit.  He has history of ischemic heart disease diagnosed many years ago when he was noted to have disease involving the ostial circumflex being conservatively managed.  He got admitted to the hospital recently with chest pain.  It appears to be secondary to  his lungs.  He underwent CTA and found to have no pulmonary embolism.  He did some have some infection which was treated aggressively with medication.  He also underwent stress test and echocardiogram which showed normal LV functions, no ischemia and mild pulmonary hypertension.  He apparently underwent bronchoscopy and bronchial lavage few weeks ago.  The results showed no evidence of malignancy and no evidence of any tuberculosis.  He is feeling much better since then.  His shortness of breath is improved.              Cardiac History  Past Surgical History:   Procedure Laterality Date   • CARDIAC CATHETERIZATION  09/22/2016    65-70% Small LCX   • CARDIAC CATHETERIZATION  11/29/2004    Cath- 85% Ostial LCX   • CARDIOVASCULAR STRESS TEST  11/10/2004    Stress- 2 min. 100% THR. Apical Ishchemia   • CARDIOVASCULAR STRESS TEST  12/04/2009    Stress- 2 min 36 sec. 91% THR. Anterior Ischemia. Norvasc 5mg 1 QD   • CARDIOVASCULAR STRESS TEST  10/10/2011    Stress- 3 min. 95% THR, BP- 178/80. AnteroSeptal Ischemia   • CARDIOVASCULAR STRESS TEST  08/02/2016    L. Myoview- Small Anterolateral Ischemia   • CARDIOVASCULAR STRESS TEST  [facility]11/[facility]    [facility] EF > 70%. Negative.   • ECHO - CONVERTED  11/29/2004    Echo- EF 55-60%. Apical WMA.   • ECHO -  CONVERTED  2009    Echo- EF 55-59%   • ECHO - CONVERTED  10/10/2011    Echo-EF 65%. Septal WMA   • ECHO - CONVERTED  2016    EF 65%   • ECHO - CONVERTED  2017    EF > 65%, mild MR, RVSP 27 mmHg   • ECHO - CONVERTED  2019    @ [facility]. EF 65%. Mild MR. RVSP- 46 mmHg.       Current Outpatient Medications   Medication Sig Dispense Refill   • aspirin 325 MG tablet Take 1 tablet by mouth Daily. 90 tablet 3   • carbidopa-levodopa (SINEMET)  MG per tablet Take 1 tablet by mouth Daily.     • celecoxib (CeleBREX) 200 MG capsule Take 200 mg by mouth Daily.     • Cholecalciferol (VITAMIN D3) 2000 UNITS capsule 2 capsules once daily     • esomeprazole (nexIUM) 40 MG capsule Take 40 mg by mouth Every Morning Before Breakfast.     • L-Arginine 1000 MG tablet Take 1,000 mg by mouth 2 (Two) Times a Day. 180 each 3   • Loratadine 10 MG capsule Take  by mouth Daily.     • ranolazine (RANEXA) 1000 MG 12 hr tablet Take 1 tablet by mouth Every 12 (Twelve) Hours. 180 tablet 3   • rosuvastatin (CRESTOR) 20 MG tablet Take 1 tablet by mouth Every Night. 90 tablet 3   • tamsulosin (FLOMAX) 0.4 MG capsule 24 hr capsule Take 1 capsule by mouth Every Night.       No current facility-administered medications for this visit.        Allergies  :  Percocet [oxycodone-acetaminophen]       Past Medical History:   Diagnosis Date   • History of kidney stones    • History of palpitations    • History of prostate disorder     laser surgery   • Hypercholesteremia    • Hypertension    • IHD (ischemic heart disease)    • PUD (peptic ulcer disease)        Social History     Socioeconomic History   • Marital status:      Spouse name: Not on file   • Number of children: Not on file   • Years of education: Not on file   • Highest education level: Not on file   Tobacco Use   • Smoking status: Former Smoker     Last attempt to quit: 1980     Years since quittin.8   • Smokeless tobacco: Never Used   Substance and Sexual  "Activity   • Alcohol use: No   • Drug use: No       Family History   Problem Relation Age of Onset   • Diabetes Mother    • Heart disease Mother    • Hyperlipidemia Mother    • Hypertension Mother    • Hypertension Father    • Hyperlipidemia Father    • Heart disease Father    • Diabetes Other    • Hyperlipidemia Other    • Hypertension Daughter        Review of Systems   Constitution: Negative for decreased appetite and malaise/fatigue.   HENT: Negative for congestion and sore throat.    Eyes: Negative for blurred vision.   Cardiovascular: Positive for dyspnea on exertion. Negative for chest pain.   Respiratory: Positive for shortness of breath. Negative for snoring.    Endocrine: Negative for cold intolerance and heat intolerance.   Hematologic/Lymphatic: Negative for adenopathy. Does not bruise/bleed easily.   Skin: Negative for itching, nail changes and skin cancer.   Musculoskeletal: Negative for arthritis and myalgias.   Gastrointestinal: Negative for abdominal pain, dysphagia and heartburn.   Genitourinary: Negative for bladder incontinence and frequency.   Neurological: Positive for tremors. Negative for dizziness, light-headedness, seizures and vertigo.   Psychiatric/Behavioral: Negative for altered mental status.   Allergic/Immunologic: Negative for environmental allergies and hives.       Diabetes- No  Thyroid- normal    Objective     /70   Pulse 92   Ht 181.6 cm (71.5\")   Wt 73.9 kg (163 lb)   BMI 22.42 kg/m²     Physical Exam   Constitutional: He is oriented to person, place, and time. He appears well-developed and well-nourished.   HENT:   Head: Normocephalic.   Nose: Nose normal.   Eyes: EOM are normal. Pupils are equal, round, and reactive to light.   Neck: Normal range of motion. Neck supple.   Cardiovascular: Normal rate, regular rhythm, S1 normal and S2 normal.   Murmur heard.  Pulmonary/Chest: Effort normal and breath sounds normal.   Abdominal: Soft. Bowel sounds are normal. "   Musculoskeletal: Normal range of motion. He exhibits no edema.   Neurological: He is alert and oriented to person, place, and time.   Skin: Skin is warm and dry.   Psychiatric: He has a normal mood and affect.     Procedures            Assessment/Plan   Patient's Body mass index is 22.42 kg/m². BMI is within normal parameters. No follow-up required..     Zelalem was seen today for hospital follow up , med refill, labs and procedure.    Diagnoses and all orders for this visit:    IHD (ischemic heart disease)  -     aspirin 325 MG tablet; Take 1 tablet by mouth Daily.  -     ranolazine (RANEXA) 1000 MG 12 hr tablet; Take 1 tablet by mouth Every 12 (Twelve) Hours.  -     L-Arginine 1000 MG tablet; Take 1,000 mg by mouth 2 (Two) Times a Day.    Hypercholesteremia  -     rosuvastatin (CRESTOR) 20 MG tablet; Take 1 tablet by mouth Every Night.    Shortness of breath    Murmur, cardiac    Coarse tremors       At baseline his heart rate is upper limit of normal his blood pressure is stable.  His BMI is much better at 22.  He does have some good appetite now and is eating better.  He denies having any more chest pain.  He denies having any dizziness or loss of consciousness.  His cardiovascular examination is unremarkable and his lungs had good air entry.  Regarding his ischemic heart disease we will continue his aspirin.  He is not able to tolerate any other cardiac medications so he was put on Ranexa on L-arginine and it helps with his anginal symptoms.  Regarding his hypercholesterolemia continue on Crestor.  His problems with lungs appears to be getting better after the bronchoscopy and is followed by a pulmonologist.  He still has some coarse tremors and uses only once a day medication.  He has not had any dizziness or syncope at this time.  Overall cardiac status appears to be stable and I will see him back in 6 months or sooner if needed.  Prescription for his cardiac medicines were given.                   Electronically signed by Roosevelt Rodriguez MD November 13, 2019 1:19 PM

## 2020-05-19 ENCOUNTER — OFFICE VISIT (OUTPATIENT)
Dept: CARDIOLOGY | Facility: CLINIC | Age: 80
End: 2020-05-19

## 2020-05-19 VITALS
HEIGHT: 71 IN | SYSTOLIC BLOOD PRESSURE: 100 MMHG | TEMPERATURE: 98 F | DIASTOLIC BLOOD PRESSURE: 60 MMHG | WEIGHT: 157 LBS | BODY MASS INDEX: 21.98 KG/M2 | HEART RATE: 80 BPM

## 2020-05-19 DIAGNOSIS — I25.9 IHD (ISCHEMIC HEART DISEASE): ICD-10-CM

## 2020-05-19 DIAGNOSIS — E78.00 HYPERCHOLESTEREMIA: ICD-10-CM

## 2020-05-19 DIAGNOSIS — R06.02 SHORTNESS OF BREATH: ICD-10-CM

## 2020-05-19 DIAGNOSIS — Z79.899 MEDICATION MANAGEMENT: ICD-10-CM

## 2020-05-19 PROCEDURE — 99213 OFFICE O/P EST LOW 20 MIN: CPT | Performed by: NURSE PRACTITIONER

## 2020-05-19 RX ORDER — RANOLAZINE 1000 MG/1
1000 TABLET, EXTENDED RELEASE ORAL EVERY 12 HOURS SCHEDULED
Qty: 180 TABLET | Refills: 3 | Status: SHIPPED | OUTPATIENT
Start: 2020-05-19 | End: 2020-11-23 | Stop reason: SDUPTHER

## 2020-05-19 RX ORDER — CEPHRADINE 500 MG
1000 CAPSULE ORAL DAILY
Qty: 90 EACH | Refills: 3 | Status: SHIPPED | OUTPATIENT
Start: 2020-05-19 | End: 2020-11-23 | Stop reason: SDUPTHER

## 2020-05-19 RX ORDER — ROSUVASTATIN CALCIUM 20 MG/1
20 TABLET, COATED ORAL NIGHTLY
Qty: 90 TABLET | Refills: 3 | Status: SHIPPED | OUTPATIENT
Start: 2020-05-19 | End: 2020-11-23 | Stop reason: SDUPTHER

## 2020-05-19 RX ORDER — OMEPRAZOLE 40 MG/1
40 CAPSULE, DELAYED RELEASE ORAL DAILY
COMMUNITY

## 2020-05-19 NOTE — PROGRESS NOTES
"Chief Complaint   Patient presents with   • Follow-up     Cardiac management   • Lab     Last labs 4/2020 per PCP.   • Chest Pain     Having occasional sharp pains like needle sticking, not often. He states \"occasionally my feet and legs feels like I have an electrical wire hitting me\".   • Dizziness     Only occasional, not often, has when standing up at times.   • Med Refill     Needs refills on cardiac medication-90 day.       Subjective       Zelalem Warren is a 79 y.o. male history of ischemic heart disease diagnosed many years ago when he was noted to have disease involving the ostial circumflex being conservatively managed.  In 019, he was admitted to the hospital with chest pain.  It appeared to be secondary to his lungs.  He underwent CTA and found to have no pulmonary embolism.  He did some have some infection which was treated aggressively with medication.  He also underwent stress test and echocardiogram which showed normal LV functions, no ischemia and mild pulmonary hypertension.  He apparently underwent bronchoscopy and bronchial lavage. The results showed no evidence of malignancy and no evidence of any tuberculosis.    Today he comes to the office for a follow-up visit.  He denies cardiac concerns.  Occasionally he feels a prickly sharp pain in his chest but this is not a new or worsening symptom.  His shortness of breath remains improved since treatment last year.  No recent fevers or cough reported.  No recent change in medications noted.  Occasionally when standing he feels lightheaded but denies syncope.    HPI     Cardiac History:    Past Surgical History:   Procedure Laterality Date   • CARDIAC CATHETERIZATION  09/22/2016    65-70% Small LCX   • CARDIAC CATHETERIZATION  11/29/2004    Cath- 85% Ostial LCX   • CARDIOVASCULAR STRESS TEST  11/10/2004    Stress- 2 min. 100% THR. Apical Ishchemia   • CARDIOVASCULAR STRESS TEST  12/04/2009    Stress- 2 min 36 sec. 91% THR. Anterior Ischemia. " Norvasc 5mg 1 QD   • CARDIOVASCULAR STRESS TEST  10/10/2011    Stress- 3 min. 95% THR, BP- 178/80. AnteroSeptal Ischemia   • CARDIOVASCULAR STRESS TEST  08/02/2016    L. Myoview- Small Anterolateral Ischemia   • CARDIOVASCULAR STRESS TEST  09/11/2019    @ [facility]. A. Myoview- EF > 70%. Negative.   • ECHO - CONVERTED  11/29/2004    Echo- EF 55-60%. Apical WMA.   • ECHO - CONVERTED  12/04/2009    Echo- EF 55-59%   • ECHO - CONVERTED  10/10/2011    Echo-EF 65%. Septal WMA   • ECHO - CONVERTED  08/02/2016    EF 65%   • ECHO - CONVERTED  03/01/2017    EF > 65%, mild MR, RVSP 27 mmHg   • ECHO - CONVERTED  09/11/2019    @ [facility]. EF 65%. Mild MR. RVSP- 46 mmHg.       Current Outpatient Medications   Medication Sig Dispense Refill   • aspirin 325 MG tablet Take 1 tablet by mouth Daily. 90 tablet 3   • carbidopa-levodopa (SINEMET)  MG per tablet Take 1 tablet by mouth Daily.     • celecoxib (CeleBREX) 200 MG capsule Take 200 mg by mouth Daily.     • Cholecalciferol (VITAMIN D3) 2000 UNITS capsule 2 capsules once daily     • Glycopyrrolate (Lonhala Magnair Refill Kit) 25 MCG/ML solution Inhale 2 (Two) Times a Day.     • L-Arginine 1000 MG tablet Take 1,000 mg by mouth Daily. 90 each 3   • omeprazole (priLOSEC) 40 MG capsule Take 40 mg by mouth Daily.     • ranolazine (Ranexa) 1000 MG 12 hr tablet Take 1 tablet by mouth Every 12 (Twelve) Hours. 180 tablet 3   • rosuvastatin (CRESTOR) 20 MG tablet Take 1 tablet by mouth Every Night. 90 tablet 3   • tamsulosin (FLOMAX) 0.4 MG capsule 24 hr capsule Take 1 capsule by mouth Every Night.       No current facility-administered medications for this visit.        Percocet [oxycodone-acetaminophen]    Past Medical History:   Diagnosis Date   • History of kidney stones    • History of palpitations    • History of prostate disorder     laser surgery   • Hypercholesteremia    • Hypertension    • IHD (ischemic heart disease)    • PUD (peptic ulcer disease)        Social History          Socioeconomic History   • Marital status:      Spouse name: Not on file   • Number of children: Not on file   • Years of education: Not on file   • Highest education level: Not on file   Tobacco Use   • Smoking status: Former Smoker     Last attempt to quit: 1980     Years since quittin.4   • Smokeless tobacco: Never Used   Substance and Sexual Activity   • Alcohol use: No   • Drug use: No       Family History   Problem Relation Age of Onset   • Diabetes Mother    • Heart disease Mother    • Hyperlipidemia Mother    • Hypertension Mother    • Hypertension Father    • Hyperlipidemia Father    • Heart disease Father    • Diabetes Other    • Hyperlipidemia Other    • Hypertension Daughter        Review of Systems   Constitution: Positive for malaise/fatigue (Same) and weight loss. Negative for decreased appetite, diaphoresis and fever.   HENT: Negative for congestion and hoarse voice.    Eyes: Negative for double vision, redness and visual disturbance.   Cardiovascular: Negative for chest pain, leg swelling and palpitations.   Respiratory: Positive for shortness of breath. Negative for cough and sputum production.    Endocrine: Negative for polydipsia, polyphagia and polyuria.   Hematologic/Lymphatic: Negative for bleeding problem. Does not bruise/bleed easily.   Skin: Negative.    Musculoskeletal: Positive for arthritis. Negative for muscle cramps and myalgias.   Gastrointestinal: Negative for abdominal pain, change in bowel habit, dysphagia, melena and nausea.   Genitourinary: Negative for dysuria and hematuria.   Neurological: Positive for light-headedness (Sometimes when standing up).   Psychiatric/Behavioral: Negative for altered mental status. The patient is nervous/anxious (at times). The patient does not have insomnia.         Objective      Labs 10/30/2019 sodium 141, potassium 4.6, chloride 108, CO2 28, glucose 102, BUN 25, creatinine 1.1, estimated creatinine clearance 58.8, total protein  "7.9, albumin 3.5, calcium 9.8, total bili 0.5, AST 29, AL T 24, ALP 69 RBC 3.77, hemoglobin 11.2, hematocrit 35.8, platelets 256    /60 (BP Location: Right arm)   Pulse 80   Temp 98 °F (36.7 °C)   Ht 181.6 cm (71.5\")   Wt 71.2 kg (157 lb)   BMI 21.59 kg/m²     Physical Exam   Constitutional: He is oriented to person, place, and time. He appears well-nourished.   HENT:   Head: Normocephalic.   Eyes: Pupils are equal, round, and reactive to light. Conjunctivae are normal.   Neck: Normal range of motion. Neck supple. Carotid bruit is not present.   Cardiovascular: Normal rate, regular rhythm, S1 normal, S2 normal and normal pulses.   No murmur heard.  Pulmonary/Chest: Breath sounds normal. He has no rales.   Abdominal: Soft. Bowel sounds are normal. There is no tenderness.   Musculoskeletal: Normal range of motion. He exhibits edema.   Neurological: He is alert and oriented to person, place, and time.   Skin: Skin is warm and dry. No pallor.   Psychiatric: He has a normal mood and affect. His behavior is normal.        Procedures: none today         Assessment/Plan      Zelalem was seen today for follow-up, lab, chest pain, dizziness and med refill.    Diagnoses and all orders for this visit:    IHD (ischemic heart disease)  -     ranolazine (Ranexa) 1000 MG 12 hr tablet; Take 1 tablet by mouth Every 12 (Twelve) Hours.  -     L-Arginine 1000 MG tablet; Take 1,000 mg by mouth Daily.    Hypercholesteremia  -     rosuvastatin (CRESTOR) 20 MG tablet; Take 1 tablet by mouth Every Night.    Shortness of breath    Medication management      IHD- stress and echo done last year were stable.  Patient presents without new or worsening symptoms.  Continue Ranexa and L-arginine.  Prescriptions sent to his pharmacy.    Hypercholesterolemia-on Crestor without issues noted.  He will follow with you for lab orders/management.  Please forward copy of next lab results when available.    Patient's Body mass index is 21.59 " kg/m². BMI is within normal parameters. No follow-up required.  His weight is down 6 pounds since last office visit.  I encouraged him on adequate dietary intake as well as maintaining good hydration.  With adequate water intake this should help limit orthostatic dizziness.  Patient admits to good appetite at this time.     Zelalem Warren  reports that he quit smoking about 40 years ago. He has never used smokeless tobacco.    From a cardiac standpoint patient appears stable.  A 6-month follow-up visit scheduled.  Please call sooner for any cardiac concerns.           Electronically signed by COOKIE Martinez,  May 20, 2020 10:00

## 2020-11-23 ENCOUNTER — OFFICE VISIT (OUTPATIENT)
Dept: CARDIOLOGY | Facility: CLINIC | Age: 80
End: 2020-11-23

## 2020-11-23 VITALS
WEIGHT: 159.4 LBS | TEMPERATURE: 97.7 F | HEIGHT: 71 IN | BODY MASS INDEX: 22.31 KG/M2 | SYSTOLIC BLOOD PRESSURE: 110 MMHG | DIASTOLIC BLOOD PRESSURE: 76 MMHG | HEART RATE: 76 BPM

## 2020-11-23 DIAGNOSIS — E78.00 HYPERCHOLESTEREMIA: ICD-10-CM

## 2020-11-23 DIAGNOSIS — I25.9 IHD (ISCHEMIC HEART DISEASE): ICD-10-CM

## 2020-11-23 DIAGNOSIS — R06.02 SHORTNESS OF BREATH: Primary | ICD-10-CM

## 2020-11-23 PROCEDURE — 99213 OFFICE O/P EST LOW 20 MIN: CPT | Performed by: NURSE PRACTITIONER

## 2020-11-23 RX ORDER — ROSUVASTATIN CALCIUM 20 MG/1
20 TABLET, COATED ORAL NIGHTLY
Qty: 90 TABLET | Refills: 3 | Status: SHIPPED | OUTPATIENT
Start: 2020-11-23

## 2020-11-23 RX ORDER — ASPIRIN 325 MG
325 TABLET ORAL DAILY
Qty: 90 TABLET | Refills: 3 | Status: SHIPPED | OUTPATIENT
Start: 2020-11-23

## 2020-11-23 RX ORDER — CEPHRADINE 500 MG
1000 CAPSULE ORAL DAILY
Qty: 90 EACH | Refills: 3 | Status: SHIPPED | OUTPATIENT
Start: 2020-11-23

## 2020-11-23 RX ORDER — RANOLAZINE 1000 MG/1
1000 TABLET, EXTENDED RELEASE ORAL EVERY 12 HOURS SCHEDULED
Qty: 180 TABLET | Refills: 3 | Status: SHIPPED | OUTPATIENT
Start: 2020-11-23

## 2020-11-23 NOTE — PROGRESS NOTES
Chief Complaint   Patient presents with   • Follow-up     Cardiac management.   • Lab     Last labs a month ago per PCP.   • Shortness of Breath     He reports about the same as before, he follows with Dr Kilpatrick.   • Med Refill     Needs refills on cardiac medications-90 day.       Subjective       Zelalem Warren is a 80 y.o. male with ischemic heart disease diagnosed many years ago when he was noted to have disease involving the ostial circumflex being conservatively managed.  In 2019, he was admitted to the hospital with chest pain which appeared to be related to his lungs.  He underwent CTA found to have no pulmonary embolism.  He did some have some infection which was treated aggressively with medication.  He also underwent stress test and echocardiogram which showed normal LV function, no ischemia and mild pulmonary hypertension.  He apparently underwent bronchoscopy and bronchial lavage. The results showed no evidence of malignancy and no evidence of any tuberculosis.    He returns today for regular follow up. He denies cardiac symptoms. Continues to have mild dyspnea but no worse than before. Blood pressure has been well controlled. Labs are followed by PCP reported by patient as well controlled. Blood pressure remains well controlled without medication. He has been managed with aspirin, statin, Ranexa and L-arginine. Refills requested.          Cardiac History:    Past Surgical History:   Procedure Laterality Date   • CARDIAC CATHETERIZATION  09/22/2016    65-70% Small LCX   • CARDIAC CATHETERIZATION  11/29/2004    Cath- 85% Ostial LCX   • CARDIOVASCULAR STRESS TEST  11/10/2004    Stress- 2 min. 100% THR. Apical Ishchemia   • CARDIOVASCULAR STRESS TEST  12/04/2009    Stress- 2 min 36 sec. 91% THR. Anterior Ischemia. Norvasc 5mg 1 QD   • CARDIOVASCULAR STRESS TEST  10/10/2011    Stress- 3 min. 95% THR, BP- 178/80. AnteroSeptal Ischemia   • CARDIOVASCULAR STRESS TEST  08/02/2016    L. Myoview- Small  Anterolateral Ischemia   • CARDIOVASCULAR STRESS TEST  09/11/2019    @ [facility]. A. Myoview- EF > 70%. Negative.   • ECHO - CONVERTED  11/29/2004    Echo- EF 55-60%. Apical WMA.   • ECHO - CONVERTED  12/04/2009    Echo- EF 55-59%   • ECHO - CONVERTED  10/10/2011    Echo-EF 65%. Septal WMA   • ECHO - CONVERTED  08/02/2016    EF 65%   • ECHO - CONVERTED  03/01/2017    EF > 65%, mild MR, RVSP 27 mmHg   • ECHO - CONVERTED  09/11/2019    @ [facility]. EF 65%. Mild MR. RVSP- 46 mmHg.     Current Outpatient Medications   Medication Sig Dispense Refill   • aspirin 325 MG tablet Take 1 tablet by mouth Daily. 90 tablet 3   • carbidopa-levodopa (SINEMET)  MG per tablet Take 1 tablet by mouth Daily.     • celecoxib (CeleBREX) 200 MG capsule Take 200 mg by mouth Daily.     • Cholecalciferol (VITAMIN D3) 2000 UNITS capsule 2 capsules once daily     • Glycopyrrolate (Lonhala Magnair Refill Kit) 25 MCG/ML solution Inhale 2 (Two) Times a Day.     • L-Arginine 1000 MG tablet Take 1,000 mg by mouth Daily. 90 each 3   • omeprazole (priLOSEC) 40 MG capsule Take 40 mg by mouth Daily.     • ranolazine (Ranexa) 1000 MG 12 hr tablet Take 1 tablet by mouth Every 12 (Twelve) Hours. 180 tablet 3   • rosuvastatin (CRESTOR) 20 MG tablet Take 1 tablet by mouth Every Night. 90 tablet 3   • tamsulosin (FLOMAX) 0.4 MG capsule 24 hr capsule Take 1 capsule by mouth Every Night.       No current facility-administered medications for this visit.      Percocet [oxycodone-acetaminophen]    Past Medical History:   Diagnosis Date   • History of kidney stones    • History of palpitations    • History of prostate disorder     laser surgery   • Hypercholesteremia    • Hypertension    • IHD (ischemic heart disease)    • PUD (peptic ulcer disease)      Social History     Socioeconomic History   • Marital status:      Spouse name: Not on file   • Number of children: Not on file   • Years of education: Not on file   • Highest education level: Not on file    "  Tobacco Use   • Smoking status: Former Smoker     Quit date:      Years since quittin.9   • Smokeless tobacco: Never Used   Substance and Sexual Activity   • Alcohol use: No   • Drug use: No     Family History   Problem Relation Age of Onset   • Diabetes Mother    • Heart disease Mother    • Hyperlipidemia Mother    • Hypertension Mother    • Hypertension Father    • Hyperlipidemia Father    • Heart disease Father    • Diabetes Other    • Hyperlipidemia Other    • Hypertension Daughter      Review of Systems   Constitution: Positive for weight gain (up 2 lb). Negative for decreased appetite and malaise/fatigue.   HENT: Negative.    Eyes: Negative for blurred vision.   Cardiovascular: Positive for dyspnea on exertion (mild ). Negative for chest pain, leg swelling, palpitations and syncope.   Respiratory: Negative for shortness of breath and sleep disturbances due to breathing.    Endocrine: Negative.    Hematologic/Lymphatic: Negative for bleeding problem. Does not bruise/bleed easily.   Skin: Negative.    Musculoskeletal: Negative for falls and myalgias.   Gastrointestinal: Negative for abdominal pain, heartburn and melena.   Genitourinary: Negative for hematuria.   Neurological: Negative for dizziness and light-headedness.   Psychiatric/Behavioral: Negative for altered mental status.   Allergic/Immunologic: Negative.         Objective     /76 (BP Location: Right arm)   Pulse 76   Temp 97.7 °F (36.5 °C)   Ht 181.6 cm (71.5\")   Wt 72.3 kg (159 lb 6.4 oz)   BMI 21.92 kg/m²     Vitals signs and nursing note reviewed.   Constitutional:       General: Not in acute distress.     Appearance: Well-developed. Not diaphoretic.   Eyes:      Pupils: Pupils are equal, round, and reactive to light.   HENT:      Head: Normocephalic.   Neck:      Musculoskeletal: Normal range of motion.   Pulmonary:      Effort: Pulmonary effort is normal. No respiratory distress.      Breath sounds: Normal breath sounds. "   Cardiovascular:      Normal rate. Regular rhythm.   Pulses:     Intact distal pulses.   Abdominal:      General: Bowel sounds are normal.      Palpations: Abdomen is soft.   Musculoskeletal: Normal range of motion.   Skin:     General: Skin is warm and dry.   Neurological:      Mental Status: Alert and oriented to person, place, and time.        Procedures          Problem List Items Addressed This Visit        Cardiovascular and Mediastinum    IHD (ischemic heart disease)    Relevant Medications    ranolazine (Ranexa) 1000 MG 12 hr tablet    L-Arginine 1000 MG tablet    aspirin 325 MG tablet    Hypercholesteremia    Relevant Medications    rosuvastatin (CRESTOR) 20 MG tablet       Respiratory    Shortness of breath - Primary         1. CAD- non-obstructive with most recent stress in 2019 showing no ischemia. Continue asp, statin, Ranexa, L-arginine. Refills sent.     2. Hyperlipidemia- managed with Crestor. Tolerates well. Could we get a copy of his most recent labs? Refills sent.     3. SOB- chronic, stable.  Managed by pulmonology. PA pressure mild to moderately elevated. No new testing ordered today as symptoms remain well controlled.     Follow up in six months or sooner if needed.     Patient's Body mass index is 21.92 kg/m². BMI is within normal parameters. No follow-up required..               Electronically signed by COOKIE Wooten,  November 25, 2020 15:49 EST

## 2021-06-10 ENCOUNTER — OFFICE VISIT (OUTPATIENT)
Dept: CARDIOLOGY | Facility: CLINIC | Age: 81
End: 2021-06-10

## 2021-06-10 VITALS
WEIGHT: 141 LBS | HEART RATE: 64 BPM | SYSTOLIC BLOOD PRESSURE: 110 MMHG | BODY MASS INDEX: 19.1 KG/M2 | HEIGHT: 72 IN | DIASTOLIC BLOOD PRESSURE: 60 MMHG

## 2021-06-10 DIAGNOSIS — E78.00 HYPERCHOLESTEREMIA: ICD-10-CM

## 2021-06-10 DIAGNOSIS — I25.9 IHD (ISCHEMIC HEART DISEASE): Primary | ICD-10-CM

## 2021-06-10 DIAGNOSIS — R63.4 WEIGHT LOSS: ICD-10-CM

## 2021-06-10 DIAGNOSIS — R06.02 SHORTNESS OF BREATH: ICD-10-CM

## 2021-06-10 PROCEDURE — 99213 OFFICE O/P EST LOW 20 MIN: CPT | Performed by: NURSE PRACTITIONER

## 2021-06-10 NOTE — PROGRESS NOTES
Chief Complaint   Patient presents with   • Follow-up     Cardiac management   • Chest Pain     Reports having chest pain, sometimesruns down his left arm and up left side of neck   • Shortness of Breath     Is scheduled to go to [facility] for needle biopsy to left lung  mid June 2021   • Med Refill     No refills needed today. Had med list today.       Subjective       Zelalem Warren is a 80 y.o. male with ischemic heart disease diagnosed many years ago when he was noted to have disease involving the ostial circumflex being conservatively managed.  In 2019, he was admitted to the hospital with chest pain which appeared to be related to his lungs.  He underwent CTA found to have no pulmonary embolism.  He did some have some infection which was treated aggressively with medication.  He also underwent stress test and echocardiogram which showed normal LV function, no ischemia and mild pulmonary hypertension.  He apparently underwent bronchoscopy and bronchial lavage. The results showed no evidence of malignancy and no evidence of any tuberculosis.    Today he comes to the office for follow-up visit.  He admits to chest, posterior neck, left shoulder/arm discomfort.  The areas are tender to touch and elicit soreness/pain with range of motion.  He denies exertional chest pain or recent palpitations.  His primary symptoms are pulmonary nature including shortness of breath, cough, and wheezing.  Since his last office visit testing showed increasing lung mass and is scheduled to undergo lung biopsy at [facility] next week.  Apparently he has been treated for pneumonia on outpatient basis.      Cardiac History:    Past Surgical History:   Procedure Laterality Date   • CARDIAC CATHETERIZATION  09/22/2016    65-70% Small LCX   • CARDIAC CATHETERIZATION  11/29/2004    Cath- 85% Ostial LCX   • CARDIOVASCULAR STRESS TEST  11/10/2004    Stress- 2 min. 100% THR. Apical Ishchemia   • CARDIOVASCULAR STRESS  TEST  12/04/2009    Stress- 2 min 36 sec. 91% THR. Anterior Ischemia. Norvasc 5mg 1 QD   • CARDIOVASCULAR STRESS TEST  10/10/2011    Stress- 3 min. 95% THR, BP- 178/80. AnteroSeptal Ischemia   • CARDIOVASCULAR STRESS TEST  08/02/2016    L. Myoview- Small Anterolateral Ischemia   • CARDIOVASCULAR STRESS TEST  09/11/2019    @ [facility]. A. Myoview- EF > 70%. Negative.   • ECHO - CONVERTED  11/29/2004    Echo- EF 55-60%. Apical WMA.   • ECHO - CONVERTED  12/04/2009    Echo- EF 55-59%   • ECHO - CONVERTED  10/10/2011    Echo-EF 65%. Septal WMA   • ECHO - CONVERTED  08/02/2016    EF 65%   • ECHO - CONVERTED  03/01/2017    EF > 65%, mild MR, RVSP 27 mmHg   • ECHO - CONVERTED  09/11/2019    @ [facility]. EF 65%. Mild MR. RVSP- 46 mmHg.       Current Outpatient Medications   Medication Sig Dispense Refill   • aspirin 325 MG tablet Take 1 tablet by mouth Daily. 90 tablet 3   • carbidopa-levodopa (SINEMET)  MG per tablet Take 1 tablet by mouth Daily.     • Cholecalciferol (VITAMIN D3) 2000 UNITS capsule 2 capsules once daily     • Glycopyrrolate (Lonhala Magnair Refill Kit) 25 MCG/ML solution Inhale 2 (Two) Times a Day.     • L-Arginine 1000 MG tablet Take 1,000 mg by mouth Daily. 90 each 3   • omeprazole (priLOSEC) 40 MG capsule Take 40 mg by mouth Daily.     • ranolazine (Ranexa) 1000 MG 12 hr tablet Take 1 tablet by mouth Every 12 (Twelve) Hours. 180 tablet 3   • rosuvastatin (CRESTOR) 20 MG tablet Take 1 tablet by mouth Every Night. 90 tablet 3   • tamsulosin (FLOMAX) 0.4 MG capsule 24 hr capsule Take 1 capsule by mouth Every Night.       No current facility-administered medications for this visit.       Percocet [oxycodone-acetaminophen]    Past Medical History:   Diagnosis Date   • History of kidney stones    • History of palpitations    • History of prostate disorder     laser surgery   • Hypercholesteremia    • Hypertension    • IHD (ischemic heart disease)    • PUD (peptic ulcer disease)        Social History          Socioeconomic History   • Marital status:      Spouse name: Not on file   • Number of children: Not on file   • Years of education: Not on file   • Highest education level: Not on file   Tobacco Use   • Smoking status: Former Smoker     Quit date:      Years since quittin.4   • Smokeless tobacco: Never Used   Vaping Use   • Vaping Use: Never used   Substance and Sexual Activity   • Alcohol use: No   • Drug use: No       Family History   Problem Relation Age of Onset   • Diabetes Mother    • Heart disease Mother    • Hyperlipidemia Mother    • Hypertension Mother    • Hypertension Father    • Hyperlipidemia Father    • Heart disease Father    • Diabetes Other    • Hyperlipidemia Other    • Hypertension Daughter        Review of Systems   Constitutional: Positive for decreased appetite and weight loss (unintentional). Negative for diaphoresis and malaise/fatigue.   HENT: Negative for nosebleeds.    Eyes: Negative for blurred vision.   Cardiovascular: Negative for chest pain, claudication, cyanosis, dyspnea on exertion, irregular heartbeat, leg swelling, near-syncope, orthopnea, palpitations, paroxysmal nocturnal dyspnea and syncope.   Respiratory: Positive for cough, shortness of breath, sputum production and wheezing.    Endocrine: Negative for cold intolerance and heat intolerance.   Hematologic/Lymphatic: Negative for bleeding problem. Does not bruise/bleed easily.   Skin: Negative for rash.   Musculoskeletal: Positive for neck pain and stiffness. Negative for myalgias.   Gastrointestinal: Negative for heartburn, melena and nausea.   Genitourinary: Negative for dysuria and hematuria.   Neurological: Positive for weakness. Negative for dizziness, light-headedness and loss of balance.   Psychiatric/Behavioral: Positive for depression. Negative for memory loss. The patient is nervous/anxious.         BP Readings from Last 5 Encounters:   06/10/21 110/60   20 110/76   20 100/60  "  11/13/19 114/70   03/12/19 104/62       Wt Readings from Last 5 Encounters:   06/10/21 64 kg (141 lb)   11/23/20 72.3 kg (159 lb 6.4 oz)   05/19/20 71.2 kg (157 lb)   11/13/19 73.9 kg (163 lb)   03/12/19 71.7 kg (158 lb)       Objective      Labs 5/25/2021 PSA 3.1 labs 10/26/2020: A1c 6.1, TSH 2.61, vitamin D 73.5, platelets 131, total cholesterol 176, triglycerides 132, HDL 48, , glucose 112, BUN 18, creatinine 0.78, GFR 86, sodium 138, potassium 4.2, chloride 100, calcium 9.9, total protein 7.4, albumin 3.9, total globulin 3.5, total bili 0.5, ALP 99, AST 26, ALT 37, WBC 11.1, RBC 4.26, hemoglobin 12.3         /60 (BP Location: Right arm)   Pulse 64   Ht 181.6 cm (71.5\")   Wt 64 kg (141 lb)   BMI 19.39 kg/m²     Vitals and nursing note reviewed.   Eyes:      Pupils: Pupils are equal, round, and reactive to light.   HENT:      Head: Normocephalic.   Pulmonary:      Effort: Pulmonary effort is normal.      Breath sounds: Wheezing present. Rhonchi present.   Cardiovascular:      Normal rate. Regular rhythm.   Edema:     Peripheral edema absent.   Abdominal:      General: Bowel sounds are normal.      Palpations: Abdomen is soft.   Musculoskeletal: Normal range of motion.      Cervical back: Normal range of motion. Skin:     General: Skin is warm.   Neurological:      Mental Status: Alert and oriented to person, place, and time.          Procedures: none today          Assessment/Plan   Diagnoses and all orders for this visit:    1. IHD (ischemic heart disease) (Primary)    2. Hypercholesteremia    3. Shortness of breath    4. Weight loss         1. CAD- non-obstructive with most recent stress in 2019 showing no ischemia. Continue asp, statin, Ranexa, L-arginine. Refills sent.      2. Hyperlipidemia- managed with Crestor.      3. SOB-chronic but with worsening symptoms.  He is following with pulmonologist.  He is scheduled for lung biopsy at [facility] next week.    4.  Weight loss-we " discussed dietary supplements such as [place] or boost.  I encouraged him on high calorie nutritionally dense foods and maintaining good hydration.    Patient's Body mass index is 19.39 kg/m². indicating that he is within normal range (BMI 18.5-24.9). No BMI management plan needed..     From a cardiac standpoint patient appears stable.  No repeat testing advised at this time.  Should he need cardiac clearance for any procedures or develops worsening symptoms he understands to contact the office.  Otherwise, 6-month follow-up visit scheduled.             Electronically signed by COOKIE Martinez,  Pooja 10, 2021 10:08 EDT

## 2021-06-10 NOTE — PATIENT INSTRUCTIONS
Advance Care Planning and Advance Directives     You make decisions on a daily basis - decisions about where you want to live, your career, your home, your life. Perhaps one of the most important decisions you face is your choice for future medical care. Take time to talk with your family and your healthcare team and start planning today.  Advance Care Planning is a process that can help you:  · Understand possible future healthcare decisions in light of your own experiences  · Reflect on those decision in light of your goals and values  · Discuss your decisions with those closest to you and the healthcare professionals that care for you  · Make a plan by creating a document that reflects your wishes    Surrogate Decision Maker  In the event of a medical emergency, which has left you unable to communicate or to make your own decisions, you would need someone to make decisions for you.  It is important to discuss your preferences for medical treatment with this person while you are in good health.     Qualities of a surrogate decision maker:  • Willing to take on this role and responsibility  • Knows what you want for future medical care  • Willing to follow your wishes even if they don't agree with them  • Able to make difficult medical decisions under stressful circumstances    Advance Directives  These are legal documents you can create that will guide your healthcare team and decision maker(s) when needed. These documents can be stored in the electronic medical record.    · Living Will - a legal document to guide your care if you have a terminal condition or a serious illness and are unable to communicate. States vary by statute in document names/types, but most forms may include one or more of the following:        -  Directions regarding life-prolonging treatments        -  Directions regarding artificially provided nutrition/hydration        -  Choosing a healthcare decision maker        -  Direction  regarding organ/tissue donation    · Durable Power of  for Healthcare - this document names an -in-fact to make medical decisions for you, but it may also allow this person to make personal and financial decisions for you. Please seek the advice of an  if you need this type of document.    **Advance Directives are not required and no one may discriminate against you if you do not sign one.    Medical Orders  Many states allow specific forms/orders signed by your physician to record your wishes for medical treatment in your current state of health. This form, signed in personal communication with your physician, addresses resuscitation and other medical interventions that you may or may not want.      For more information or to schedule a time with a [facility] Advance Care Planning Facilitator contact: [email or web address] or call [phone] and someone will contact you directly.